# Patient Record
Sex: FEMALE | Race: WHITE | Employment: FULL TIME | URBAN - METROPOLITAN AREA
[De-identification: names, ages, dates, MRNs, and addresses within clinical notes are randomized per-mention and may not be internally consistent; named-entity substitution may affect disease eponyms.]

---

## 2018-01-01 ENCOUNTER — TRANSFERRED RECORDS (OUTPATIENT)
Dept: HEALTH INFORMATION MANAGEMENT | Facility: CLINIC | Age: 38
End: 2018-01-01

## 2018-01-01 LAB — PAP SMEAR - HIM PATIENT REPORTED: NEGATIVE

## 2018-05-02 ENCOUNTER — OFFICE VISIT (OUTPATIENT)
Dept: OBGYN | Facility: CLINIC | Age: 38
End: 2018-05-02
Payer: COMMERCIAL

## 2018-05-02 ENCOUNTER — NURSE TRIAGE (OUTPATIENT)
Dept: NURSING | Facility: CLINIC | Age: 38
End: 2018-05-02

## 2018-05-02 VITALS
WEIGHT: 168 LBS | BODY MASS INDEX: 27 KG/M2 | SYSTOLIC BLOOD PRESSURE: 110 MMHG | HEART RATE: 60 BPM | DIASTOLIC BLOOD PRESSURE: 62 MMHG | HEIGHT: 66 IN

## 2018-05-02 DIAGNOSIS — R10.2 FEMALE PELVIC PAIN: Primary | ICD-10-CM

## 2018-05-02 PROCEDURE — 99204 OFFICE O/P NEW MOD 45 MIN: CPT | Performed by: OBSTETRICS & GYNECOLOGY

## 2018-05-02 ASSESSMENT — ANXIETY QUESTIONNAIRES
IF YOU CHECKED OFF ANY PROBLEMS ON THIS QUESTIONNAIRE, HOW DIFFICULT HAVE THESE PROBLEMS MADE IT FOR YOU TO DO YOUR WORK, TAKE CARE OF THINGS AT HOME, OR GET ALONG WITH OTHER PEOPLE: NOT DIFFICULT AT ALL
3. WORRYING TOO MUCH ABOUT DIFFERENT THINGS: NOT AT ALL
6. BECOMING EASILY ANNOYED OR IRRITABLE: NOT AT ALL
5. BEING SO RESTLESS THAT IT IS HARD TO SIT STILL: NOT AT ALL
1. FEELING NERVOUS, ANXIOUS, OR ON EDGE: NOT AT ALL
2. NOT BEING ABLE TO STOP OR CONTROL WORRYING: NOT AT ALL
GAD7 TOTAL SCORE: 0
7. FEELING AFRAID AS IF SOMETHING AWFUL MIGHT HAPPEN: NOT AT ALL

## 2018-05-02 ASSESSMENT — PATIENT HEALTH QUESTIONNAIRE - PHQ9: 5. POOR APPETITE OR OVEREATING: NOT AT ALL

## 2018-05-02 NOTE — PROGRESS NOTES
SUBJECTIVE:                                                   Brandi Naranjo is a 38 year old female who presents to clinic today for the following health issue(s):  Patient presents with:  Abdominal Pain: New Patient ,abdominal pain           HPI:  Patient states that she has had right lower quadrant pain/pressure since March 2015.  She has had a complete GI workup including 2 abdominal pelvic CT scans and colonoscopy.  Findings of all been normal.  The pressure will come and go.  There is no precipitating factors.  It may last from a few seconds to an all day pressure.  There is some relief when she passes gas however there is no consistent pattern.  She also has had diarrhea for several years.  Her menstrual cycle is regular monthly intervals for 4-5 days of flow.  She does have a little low back ache on her right side.    Patient's last menstrual period was 04/07/2018..   Patient is sexually active, No obstetric history on file..  Using unsure for contraception.    reports that she has never smoked. She does not have any smokeless tobacco history on file.    STD testing offered?  Declined    Health maintenance updated:  yes    Today's PHQ-2 Score: No flowsheet data found.  Today's PHQ-9 Score:   PHQ-9 SCORE 5/2/2018   Total Score 2     Today's FLOYD-7 Score:   FLOYD-7 SCORE 5/2/2018   Total Score 0       Problem list and histories reviewed & adjusted, as indicated.  Additional history: as documented.    Patient Active Problem List   Diagnosis     Palpitations     No past surgical history on file.   Social History   Substance Use Topics     Smoking status: Never Smoker     Smokeless tobacco: Not on file     Alcohol use Yes      Comment: 1 glass a wine montly           Current Outpatient Prescriptions   Medication Sig     Biotin 1 MG CAPS Take 5 mg by mouth     cetirizine (ZYRTEC) 10 MG tablet Take 10 mg by mouth daily     MAGNESIUM OXIDE PO Take 400 mg by mouth daily     VITAMIN D, CHOLECALCIFEROL, PO Take  "5,000 Units by mouth daily     No current facility-administered medications for this visit.      Allergies   Allergen Reactions     Amoxicillin      Tylenol [Acetaminophen]        ROS:  12 point review of systems negative other than symptoms noted below.  Constitutional: Fatigue  Cardiovascular: Palpitations  Gastrointestinal: Abdominal Pain, Bloating and Diarrhea    OBJECTIVE:     /62  Pulse 60  Ht 5' 6\" (1.676 m)  Wt 168 lb (76.2 kg)  LMP 04/07/2018  BMI 27.12 kg/m2  Body mass index is 27.12 kg/(m^2).    Exam:  Constitutional:  Appearance: Well nourished, well developed alert, in no acute distress  Chest:  Respiratory Effort:  Breathing unlabored  Cardiovascular: no edema  Gastrointestinal:  Abdominal Examination:  Abdomen nontender to palpation, tone normal without rigidity or guarding, no masses present, umbilicus without lesions; Liver/Spleen:  No hepatomegaly present, liver nontender to palpation; Hernias:  No hernias present  Lymphatic: Lymph Nodes:  No other lymphadenopathy present  Skin:General Inspection:  No rashes present, no lesions present, no areas of discoloration; Genitalia and Groin:  No rashes present, no lesions present, no areas of discoloration, no masses present.  Neurologic/Psychiatric:  Mental Status:  Oriented X3   Pelvic Exam:  External Genitalia:     Normal appearance for age, no discharge present, no tenderness present, no inflammatory lesions present, color normal  Vagina:     Normal vaginal vault without central or paravaginal defects, no discharge present, no inflammatory lesions present, no masses present  Bladder:     Nontender to palpation  Urethra:   Urethral Body:  Urethra palpation normal, urethra structural support normal   Urethral Meatus:  No erythema or lesions present  Cervix:     Appearance healthy, no lesions present, nontender to palpation, no bleeding present  Uterus:     Uterus: firm, normal sized and nontender, anteverted in position.   Adnexa:     No " adnexal tenderness present, no adnexal masses present  Perineum:     Perineum within normal limits, no evidence of trauma, no rashes or skin lesions present  Anus:     Anus within normal limits, no hemorrhoids present  Inguinal Lymph Nodes:     No lymphadenopathy present  Pubic Hair:     Normal pubic hair distribution for age  Genitalia and Groin:     No rashes present, no lesions present, no areas of discoloration, no masses present       In-Clinic Test Results:  No results found for this or any previous visit (from the past 24 hour(s)).    ASSESSMENT/PLAN:                                                        ICD-10-CM    1. Female pelvic pain R10.2 US Transvaginal Non OB           Plan: Patient will return to clinic for an ultrasound following her next menstrual cycle.  The pain sounds more typical for ovarian in origin.  We did discuss that we may not have any definitive answer but that if this continues to bother her that a laparoscopy and right salpingo-oophorectomy would be an option.    Eugene Montana MD  [facility] [place] [facility]

## 2018-05-02 NOTE — TELEPHONE ENCOUNTER
"Patient calling reporting lower right sided abdominal pain x 3 years. Reporting symptoms started 3/2015. Patient reporting she had full work up and colonoscopy including 2 CT scans through 2016. Reporting ongoing intermittent lower abdominal bloating. Frequent loose stools. Right lower abdominal \"pressure\" into back. Patient is requesting OB/GYN pelvic exam. Connected to [facility] scheduling.  Advised patient per guidelines below to see provider with in 4 hours. Caller verbalized understanding. Denies further questions.    Yamilet Hall RN  [facility]      "

## 2018-05-02 NOTE — MR AVS SNAPSHOT
After Visit Summary   5/2/2018    Brandi Naranjo    MRN: [id number]           Patient Information     Date Of Birth          1980        Visit Information        Provider Department      5/2/2018 1:30 PM Eugene Montana MD [facility]         Follow-ups after your visit        Your next 10 appointments already scheduled     May 10, 2018 10:40 AM CDT   US PELVIC COMPLETE W TRANSVAGINAL with [email or web address]   [facility] ([facility])    [address] [address]   [phone]           Please bring a list of your medicines (including vitamins, minerals and over-the-counter drugs). Also, tell your doctor about any allergies you may have. Wear comfortable clothes and leave your valuables at home.  Adults: Drink six 8-ounce glasses of fluid one hour before your exam. Do NOT empty your bladder.  If you need to empty your bladder before your exam, try to release only a little bit of urine. Then, drink another 8oz glass of fluid.  Children: Children who are potty trained should drink at least 4 cups (32 oz) of liquid 45 minutes to one hour prior to the exam. The child s bladder must be full in order to achieve a diagnostic exam. If your child is very uncomfortable or has an urgent need to pee, please notify a technologist; they will try to find out how much longer the wait may be and provide instructions to help relieve the pressure. Occasionally it is medically necessary to insert a urinary catheter to fill the bladder.  Please call the Imaging Department at your exam site with any questions.            May 10, 2018 11:15 AM CDT   SHORT with Eugene Montana MD   [facility] ([facility])    [address]  [facility] [address] [phone]              Who to contact     If you have questions or need follow up information about today's clinic  "visit or your schedule please contact [facility] directly at [phone].  Normal or non-critical lab and imaging results will be communicated to you by MyChart, letter or phone within 4 business days after the clinic has received the results. If you do not hear from us within 7 days, please contact the clinic through [employer] or phone. If you have a critical or abnormal lab result, we will notify you by phone as soon as possible.  Submit refill requests through [employer] or call your pharmacy and they will forward the refill request to us. Please allow 3 business days for your refill to be completed.          Additional Information About Your Visit        MyChart Information     [employer] lets you send messages to your doctor, view your test results, renew your prescriptions, schedule appointments and more. To sign up, go to [email or web address] . Click on \"Log in\" on the left side of the screen, which will take you to the Welcome page. Then click on \"Sign up Now\" on the right side of the page.     You will be asked to enter the access code listed below, as well as some personal information. Please follow the directions to create your username and password.     Your access code is: [email or web address]  Expires: 2018  2:10 PM     Your access code will  in 90 days. If you need help or a new code, please call your [facility] clinic or [phone].        Care EveryWhere ID     This is your Care EveryWhere ID. This could be used by other organizations to access your [facility] medical records  [id number]        Your Vitals Were     Pulse Height Last Period BMI (Body Mass Index)          60 5' 6\" (1.676 m) 2018 27.12 kg/m2         Blood Pressure from Last 3 Encounters:   18 110/62   01/22/15 113/71    Weight from Last 3 Encounters:   18 168 lb (76.2 kg)   01/22/15 150 lb 12.8 oz (68.4 kg)              Today, you had the following     No orders found for display       Primary Care " Provider Office Phone # Fax #    Audrey Ladd [phone]       [facility] [address]  [facility] [address]        Equal Access to Services     JOVANNI RANGEL : Hadedward soriano mileso Soomaali, waaxda luqadaha, qaybta kaalmada adeegyada, artur ruizcl gabrieladela becerra dimas storm. So [facility] [phone].    ATENCIÓN: Si habla español, tiene a [place] disposición servicios gratuitos de asistencia lingüística. [facility] [phone].    We comply with applicable federal civil rights laws and [place] laws. We do not discriminate on the basis of race, color, national origin, age, disability, sex, sexual orientation, or gender identity.            Thank you!     Thank you for choosing [facility] FOR [facility]  for your care. Our goal is always to provide you with excellent care. Hearing back from our patients is one way we can continue to improve our services. Please take a few minutes to complete the written survey that you may receive in the mail after your visit with us. Thank you!             Your Updated Medication List - Protect others around you: Learn how to safely use, store and throw away your medicines at www.disposemymeds.org.          This list is accurate as of 5/2/18  2:11 PM.  Always use your most recent med list.                   Brand Name Dispense Instructions for use Diagnosis    Biotin 1 MG Caps      Take 5 mg by mouth        cetirizine 10 MG tablet    zyrTEC     Take 10 mg by mouth daily        MAGNESIUM OXIDE PO      Take 400 mg by mouth daily        VITAMIN D (CHOLECALCIFEROL) PO      Take 5,000 Units by mouth daily

## 2018-05-03 ASSESSMENT — PATIENT HEALTH QUESTIONNAIRE - PHQ9: SUM OF ALL RESPONSES TO PHQ QUESTIONS 1-9: 2

## 2018-05-03 ASSESSMENT — ANXIETY QUESTIONNAIRES: GAD7 TOTAL SCORE: 0

## 2018-05-06 ENCOUNTER — HEALTH MAINTENANCE LETTER (OUTPATIENT)
Age: 38
End: 2018-05-06

## 2018-05-09 ENCOUNTER — TELEPHONE (OUTPATIENT)
Dept: NURSING | Facility: CLINIC | Age: 38
End: 2018-05-09

## 2018-05-09 DIAGNOSIS — R10.2 PELVIC PAIN IN FEMALE: Primary | ICD-10-CM

## 2018-05-09 NOTE — TELEPHONE ENCOUNTER
5/2/18 Female pelvic pain. Pr LMP 5/6/18. Pt was told to make appointment on cycle day 5 for an US and appt. Pt is having a heavy cycle and is bleeding is still heavy. Pt wanted to know if she could make appointment for US on monday instead of Thursday. Reviewed by Reva Smiley. OK to make for Monday. Pt informed. Transferred to scheduling to make appointment.       5/2/18 Plan: Patient will return to clinic for an ultrasound following her next menstrual cycle.  The pain sounds more typical for ovarian in origin.  We did discuss that we may not have any definitive answer but that if this continues to bother her that a laparoscopy and right salpingo-oophorectomy would be an option.

## 2018-06-11 ENCOUNTER — RADIANT APPOINTMENT (OUTPATIENT)
Dept: ULTRASOUND IMAGING | Facility: CLINIC | Age: 38
End: 2018-06-11
Payer: COMMERCIAL

## 2018-06-11 ENCOUNTER — OFFICE VISIT (OUTPATIENT)
Dept: OBGYN | Facility: CLINIC | Age: 38
End: 2018-06-11
Payer: COMMERCIAL

## 2018-06-11 VITALS
WEIGHT: 171 LBS | HEIGHT: 66 IN | SYSTOLIC BLOOD PRESSURE: 114 MMHG | BODY MASS INDEX: 27.48 KG/M2 | HEART RATE: 70 BPM | DIASTOLIC BLOOD PRESSURE: 74 MMHG

## 2018-06-11 DIAGNOSIS — R10.30 LOWER ABDOMINAL PAIN: Primary | ICD-10-CM

## 2018-06-11 DIAGNOSIS — R10.2 FEMALE PELVIC PAIN: ICD-10-CM

## 2018-06-11 PROCEDURE — 76830 TRANSVAGINAL US NON-OB: CPT | Performed by: OBSTETRICS & GYNECOLOGY

## 2018-06-11 PROCEDURE — 99213 OFFICE O/P EST LOW 20 MIN: CPT | Performed by: OBSTETRICS & GYNECOLOGY

## 2018-06-11 NOTE — PROGRESS NOTES
SUBJECTIVE:                                                   Brandi Naranjo is a 38 year old female who presents to clinic today for the following health issue(s):  Patient presents with:  Ultrasound: Pelvic pain       Additional information:     HPI:  Alejandra is seen today for follow-up after her ultrasound.  Her ultrasound exam today is totally normal.  She is planning on going to [facility] for second workup.    Patient's last menstrual period was 06/03/2018..   Patient is sexually active, No obstetric history on file..  Using unsure for contraception.    reports that she has never smoked. She does not have any smokeless tobacco history on file.    STD testing offered?  Declined    Health maintenance updated:  yes    Today's PHQ-2 Score: No flowsheet data found.  Today's PHQ-9 Score:   PHQ-9 SCORE 5/2/2018   Total Score 2     Today's FLOYD-7 Score:   FLOYD-7 SCORE 5/2/2018   Total Score 0       Problem list and histories reviewed & adjusted, as indicated.  Additional history: as documented.    Patient Active Problem List   Diagnosis     Palpitations     No past surgical history on file.   Social History   Substance Use Topics     Smoking status: Never Smoker     Smokeless tobacco: Not on file     Alcohol use Yes      Comment: 1 glass a wine montly           Current Outpatient Prescriptions   Medication Sig     Biotin 1 MG CAPS Take 5 mg by mouth     cetirizine (ZYRTEC) 10 MG tablet Take 10 mg by mouth daily     MAGNESIUM OXIDE PO Take 400 mg by mouth daily     VITAMIN D, CHOLECALCIFEROL, PO Take 5,000 Units by mouth daily     No current facility-administered medications for this visit.      Allergies   Allergen Reactions     Amoxicillin      Tylenol [Acetaminophen]        ROS:  12 point review of systems negative other than symptoms noted below.  Cardiovascular: Palpitations  Gastrointestinal: Abdominal Pain and Diarrhea  Endocrine: Loss of Hair  Blood/Lymph: Easy Bleeding, Lymph Node Enlargement and Nose  "Bleeds    OBJECTIVE:     /74  Pulse 70  Ht 5' 6\" (1.676 m)  Wt 171 lb (77.6 kg)  LMP 06/03/2018  BMI 27.6 kg/m2  Body mass index is 27.6 kg/(m^2).    Exam:  Constitutional:  Appearance: Well nourished, well developed alert, in no acute distress  Chest:  Respiratory Effort:  Breathing unlabored  Cardiovascular: No edema  Neurologic/Psychiatric:  Mental Status:  Oriented X3   No Pelvic Exam performed     In-Clinic Test Results:  Results for orders placed or performed in visit on 06/11/18 (from the past 24 hour(s))   US Transvaginal Non OB    Narrative    Gynecological Ultrasonography:   Uterus: anteverted  Size: 6.31 x 4.77 x 3.35cm.    Findings: Normal   Endometrium: Thickness total 5.14mm  Findings: Normal  Right Ovary: 3.18 x 2.20 x 1.96cm.    Left Ovary: 3.16 x 2.72 x 1.90cm.   Cul de Sac/Pouch of Crow: No FF      Impression:Normal pelvic ultrasound    Eugene Montana MD    ___________________________________________________________________________  _______         ASSESSMENT/PLAN:                                                        ICD-10-CM    1. Lower abdominal pain R10.30            Plan: Patient was reassured that her pelvic exam and ultrasound are totally normal.  At this point she will make an appointment at [place] for second opinion and evaluation of her abdominal symptoms.    Eugene Montana MD  [facility] [place]  "

## 2018-06-11 NOTE — MR AVS SNAPSHOT
After Visit Summary   6/11/2018    Brandi Naranjo    MRN: [id number]           Patient Information     Date Of Birth          1980        Visit Information        Provider Department      6/11/2018 2:45 PM Eugene Montana MD [facility]        Today's Diagnoses     Lower abdominal pain    -  1       Follow-ups after your visit        Your next 10 appointments already scheduled     Jun 11, 2018  2:45 PM CDT   SHORT with Eugene Montana MD   [facility] ([facility])    [address] [place] [id number] [phone]              Who to contact     If you have questions or need follow up information about today's clinic visit or your schedule please contact [facility] directly at [phone].  Normal or non-critical lab and imaging results will be communicated to you by MyChart, letter or phone within 4 business days after the clinic has received the results. If you do not hear from us within 7 days, please contact the clinic through MyChart or phone. If you have a critical or abnormal lab result, we will notify you by phone as soon as possible.  Submit refill requests through [employer] or call your pharmacy and they will forward the refill request to us. Please allow 3 business days for your refill to be completed.          Additional Information About Your Visit        MyChart Information     [employer] gives you secure access to your electronic health record. If you see a primary care provider, you can also send messages to your care team and make appointments. If you have questions, please call your primary care clinic.  If you do not have a primary care provider, please call [phone] and they will assist you.        Care EveryWhere ID     This is your Care EveryWhere ID. This could be used by other organizations to access your [facility] medical records  [id number]       "  Your Vitals Were     Pulse Height Last Period BMI (Body Mass Index)          70 5' 6\" (1.676 m) 06/03/2018 27.6 kg/m2         Blood Pressure from Last 3 Encounters:   06/11/18 114/74   05/02/18 110/62   01/22/15 113/71    Weight from Last 3 Encounters:   06/11/18 171 lb (77.6 kg)   05/02/18 168 lb (76.2 kg)   01/22/15 150 lb 12.8 oz (68.4 kg)              Today, you had the following     No orders found for display       Primary Care Provider Office Phone # Fax #    Audrey Ladd [phone]       [facility] [address]  [facility] [address]        Equal Access to Services     JOVANNI RANGEL : Hadii mine soriano hadasho Soomaali, waaxda luqadaha, qaybta kaalmada adeegyada, artur prado haysalvador cochran . So [facility] [phone].    ATENCIÓN: Si habla español, tiene a [place] disposición servicios gratuitos de asistencia lingüística. Llame al [phone].    We comply with applicable federal civil rights laws and [place] laws. We do not discriminate on the basis of race, color, national origin, age, disability, sex, sexual orientation, or gender identity.            Thank you!     Thank you for choosing [facility] [place]  for your care. Our goal is always to provide you with excellent care. Hearing back from our patients is one way we can continue to improve our services. Please take a few minutes to complete the written survey that you may receive in the mail after your visit with us. Thank you!             Your Updated Medication List - Protect others around you: Learn how to safely use, store and throw away your medicines at www.disposemymeds.org.          This list is accurate as of 6/11/18  2:24 PM.  Always use your most recent med list.                   Brand Name Dispense Instructions for use Diagnosis    Biotin 1 MG Caps      Take 5 mg by mouth        cetirizine 10 MG tablet    zyrTEC     Take 10 mg by mouth daily        MAGNESIUM OXIDE PO      Take 400 mg by " mouth daily        VITAMIN D (CHOLECALCIFEROL) PO      Take 5,000 Units by mouth daily

## 2018-12-19 ENCOUNTER — TELEPHONE (OUTPATIENT)
Dept: OBGYN | Facility: CLINIC | Age: 38
End: 2018-12-19

## 2018-12-19 NOTE — TELEPHONE ENCOUNTER
Panel Management Review    Date of last visit with a [facility] provider: Dr Montana on 06/11/18.  Date of next visit with a [facility] provider: None.    Health Maintenance List    Health Maintenance   Topic Date Due     HPV Q1 Year  02/26/1991     PAP Q1 YR DIAGNOSTIC  02/26/1991     HIV SCREEN (SYSTEM ASSIGNED)  02/26/1996     MAMMO Q1 YR  02/26/1997     DTAP/TDAP/TD IMMUNIZATION (1 - Tdap) 02/26/2003     INFLUENZA VACCINE (1) 09/01/2018     PHQ-2 Q1 YR  05/02/2019     ZOSTER IMMUNIZATION (1 of 2) 02/26/2028     IPV IMMUNIZATION  Aged Out     MENINGITIS IMMUNIZATION  Aged Out       Composite cancer screening  Chart review shows that this patient is due/due soon for the following Pap Smear & mammogram  No results found for: PAP  No past surgical history on file.    Is hysterectomy listed in surgical history? No   Is mastectomy listed in surgical history? No     Summary:    Patient is due/failing the following:   Pap Smear & mammogram    Action needed: Need name of clinic, approx date, and results of most recent pap/HPV. If been more than 3 years, is due.    Type of outreach:  Phone, left message for patient to call back.       Staff Signature:  Idania Philippe CMA

## 2019-03-15 NOTE — TELEPHONE ENCOUNTER
2nd attempt. Spoke to pt    Please abstract the following data from this visit with this patient into the appropriate field in Epic:    Mammogram done on this date: 01/01/2013 (approximately), by this group: [facility], results were normal. Pt states she doesn't need until age 43.   Pap smear & HPV done on this date: 01/01/2018 (approximately), by this group: [facility], results were negative, HPV-.

## 2019-03-22 ENCOUNTER — HEALTH MAINTENANCE LETTER (OUTPATIENT)
Age: 39
End: 2019-03-22

## 2019-05-16 ENCOUNTER — HEALTH MAINTENANCE LETTER (OUTPATIENT)
Age: 39
End: 2019-05-16

## 2019-11-06 ENCOUNTER — HEALTH MAINTENANCE LETTER (OUTPATIENT)
Age: 39
End: 2019-11-06

## 2020-11-29 ENCOUNTER — HEALTH MAINTENANCE LETTER (OUTPATIENT)
Age: 40
End: 2020-11-29

## 2021-09-19 ENCOUNTER — HEALTH MAINTENANCE LETTER (OUTPATIENT)
Age: 41
End: 2021-09-19

## 2022-01-09 ENCOUNTER — HEALTH MAINTENANCE LETTER (OUTPATIENT)
Age: 42
End: 2022-01-09

## 2022-11-21 ENCOUNTER — HEALTH MAINTENANCE LETTER (OUTPATIENT)
Age: 42
End: 2022-11-21

## 2023-04-16 ENCOUNTER — HEALTH MAINTENANCE LETTER (OUTPATIENT)
Age: 43
End: 2023-04-16